# Patient Record
Sex: MALE | Race: WHITE | URBAN - METROPOLITAN AREA
[De-identification: names, ages, dates, MRNs, and addresses within clinical notes are randomized per-mention and may not be internally consistent; named-entity substitution may affect disease eponyms.]

---

## 2019-10-22 ENCOUNTER — TRANSFERRED RECORDS (OUTPATIENT)
Dept: HEALTH INFORMATION MANAGEMENT | Facility: CLINIC | Age: 24
End: 2019-10-22

## 2019-12-04 ENCOUNTER — TRANSFERRED RECORDS (OUTPATIENT)
Dept: HEALTH INFORMATION MANAGEMENT | Facility: CLINIC | Age: 24
End: 2019-12-04

## 2019-12-30 ENCOUNTER — TRANSFERRED RECORDS (OUTPATIENT)
Dept: HEALTH INFORMATION MANAGEMENT | Facility: CLINIC | Age: 24
End: 2019-12-30

## 2020-04-23 ENCOUNTER — TRANSFERRED RECORDS (OUTPATIENT)
Dept: HEALTH INFORMATION MANAGEMENT | Facility: CLINIC | Age: 25
End: 2020-04-23

## 2020-07-01 ENCOUNTER — TRANSFERRED RECORDS (OUTPATIENT)
Dept: HEALTH INFORMATION MANAGEMENT | Facility: CLINIC | Age: 25
End: 2020-07-01

## 2020-12-07 ENCOUNTER — TRANSFERRED RECORDS (OUTPATIENT)
Dept: HEALTH INFORMATION MANAGEMENT | Facility: CLINIC | Age: 25
End: 2020-12-07

## 2021-02-05 ENCOUNTER — TRANSFERRED RECORDS (OUTPATIENT)
Dept: HEALTH INFORMATION MANAGEMENT | Facility: CLINIC | Age: 26
End: 2021-02-05

## 2021-02-09 ENCOUNTER — TRANSCRIBE ORDERS (OUTPATIENT)
Dept: OTHER | Age: 26
End: 2021-02-09

## 2021-02-09 DIAGNOSIS — N35.919 URETHRAL STRICTURE: Primary | ICD-10-CM

## 2021-02-11 NOTE — TELEPHONE ENCOUNTER
MEDICAL RECORDS REQUEST   [facility]  Urology Clinic  [address] [place] [address]  PHONE: [phone]  Fax: [phone]        FUTURE VISIT INFORMATION                                                   EVELYN Del Toro: 1995 scheduled for future visit at [facility]    APPOINTMENT INFORMATION:    Date: 3/8/2021 3:30PM    Provider:  Jozef DAVIS    Reason for Visit/Diagnosis: Urethral stricture     REFERRAL INFORMATION:    Referring provider:  N/A    Specialty: N/A    Referring providers clinic:  [facility]    Clinic contact number:  N/A    RECORDS REQUESTED FOR VISIT                                                     NOTES  STATUS/DETAILS   OFFICE NOTE from referring provider  yes   OFFICE NOTE from other specialist  yes   DISCHARGE SUMMARY from hospital  no   DISCHARGE REPORT from the ER  no   OPERATIVE REPORT  no   MEDICATION LIST  yes   URETHRAL STRICTURE     RUG (IMAGES & REPORT)  in process   VCUG  (IMAGES & REPORT)  in process     PRE-VISIT CHECKLIST      Record collection complete YES- Urology recs scanned in epic  Images in  PACS  CT AP 20  ALMA 20, 10/22/19    If no, please explain: CSS faxed to [facility] to obtain recs    Appointment appropriately scheduled           (right time/right provider) Yes   MyChart activation If no, please explain: in process    Questionnaire complete If no, please explain: in process      Completed by: Yenni Alexis

## 2021-02-26 ENCOUNTER — PRE VISIT (OUTPATIENT)
Dept: UROLOGY | Facility: CLINIC | Age: 26
End: 2021-02-26

## 2021-02-26 DIAGNOSIS — N35.919 URETHRAL STRICTURE: Primary | ICD-10-CM

## 2021-02-26 NOTE — TELEPHONE ENCOUNTER
Reason for visit: urethral stricture consult     Relevant information: SP tube    Records/imaging/labs/orders: orders placed for imaging    Pt called: yes    At Rooming: standard

## 2021-03-01 ENCOUNTER — HOSPITAL ENCOUNTER (OUTPATIENT)
Dept: GENERAL RADIOLOGY | Facility: CLINIC | Age: 26
Discharge: HOME | End: 2021-03-01
Attending: UROLOGY | Admitting: UROLOGY
Payer: COMMERCIAL

## 2021-03-01 DIAGNOSIS — N35.919 URETHRAL STRICTURE: ICD-10-CM

## 2021-03-01 PROCEDURE — 255N000002 HC RX 255 OP 636: Performed by: RADIOLOGY

## 2021-03-01 PROCEDURE — 51610 INJECTION FOR BLADDER X-RAY: CPT | Mod: GC | Performed by: RADIOLOGY

## 2021-03-01 PROCEDURE — 74455 X-RAY URETHRA/BLADDER: CPT | Mod: 26 | Performed by: RADIOLOGY

## 2021-03-01 PROCEDURE — 74455 X-RAY URETHRA/BLADDER: CPT

## 2021-03-01 PROCEDURE — 250N000009 HC RX 250: Performed by: RADIOLOGY

## 2021-03-01 PROCEDURE — 51610 INJECTION FOR BLADDER X-RAY: CPT

## 2021-03-01 RX ORDER — IOPAMIDOL 612 MG/ML
50 INJECTION, SOLUTION INTRAVASCULAR ONCE
Status: COMPLETED | OUTPATIENT
Start: 2021-03-01 | End: 2021-03-01

## 2021-03-01 RX ORDER — LIDOCAINE HYDROCHLORIDE 20 MG/ML
1 JELLY TOPICAL ONCE
Status: COMPLETED | OUTPATIENT
Start: 2021-03-01 | End: 2021-03-01

## 2021-03-01 RX ORDER — IOPAMIDOL 510 MG/ML
150 INJECTION, SOLUTION INTRAVASCULAR ONCE
Status: COMPLETED | OUTPATIENT
Start: 2021-03-01 | End: 2021-03-01

## 2021-03-01 RX ADMIN — IOPAMIDOL 200 ML: 510 INJECTION, SOLUTION INTRAVASCULAR at 14:08

## 2021-03-01 RX ADMIN — LIDOCAINE HYDROCHLORIDE 1 TUBE: 20 JELLY TOPICAL at 14:08

## 2021-03-01 RX ADMIN — IOPAMIDOL 60 ML: 612 INJECTION, SOLUTION INTRAVENOUS at 14:06

## 2021-03-08 ENCOUNTER — OFFICE VISIT (OUTPATIENT)
Dept: UROLOGY | Facility: CLINIC | Age: 26
End: 2021-03-08
Payer: COMMERCIAL

## 2021-03-08 ENCOUNTER — PRE VISIT (OUTPATIENT)
Dept: UROLOGY | Facility: CLINIC | Age: 26
End: 2021-03-08

## 2021-03-08 VITALS — SYSTOLIC BLOOD PRESSURE: 143 MMHG | HEART RATE: 74 BPM | DIASTOLIC BLOOD PRESSURE: 80 MMHG

## 2021-03-08 DIAGNOSIS — N99.111 POSTPROCEDURAL BULBOUS URETHRAL STRICTURE: Primary | ICD-10-CM

## 2021-03-08 PROBLEM — I10 ESSENTIAL HYPERTENSION: Status: ACTIVE | Noted: 2020-03-11

## 2021-03-08 PROCEDURE — 99204 OFFICE O/P NEW MOD 45 MIN: CPT | Mod: GC | Performed by: UROLOGY

## 2021-03-08 RX ORDER — ROSUVASTATIN CALCIUM 20 MG/1
20 TABLET, COATED ORAL
COMMUNITY
Start: 2020-02-16

## 2021-03-08 RX ORDER — LISINOPRIL AND HYDROCHLOROTHIAZIDE 12.5; 2 MG/1; MG/1
1 TABLET ORAL
COMMUNITY
Start: 2020-02-12

## 2021-03-08 RX ORDER — POTASSIUM CITRATE 15 MEQ/1
3 TABLET, EXTENDED RELEASE ORAL
COMMUNITY
Start: 2020-01-23

## 2021-03-08 RX ORDER — OXYBUTYNIN CHLORIDE 10 MG/1
10 TABLET, EXTENDED RELEASE ORAL
COMMUNITY
Start: 2021-01-25 | End: 2021-03-26

## 2021-03-08 ASSESSMENT — PAIN SCALES - GENERAL: PAINLEVEL: NO PAIN (0)

## 2021-03-08 NOTE — PROGRESS NOTES
UROLOGY CONSULT HISTORY & PHYSICAL    Name: Shan Villalobos    MRN: [id number]   YOB: 1995           CHIEF COMPLAINT:  Urethral stricture  HISTORY OF PRESENT ILLNESS:  Mr. Villalobos is a 26 year old-year-old man seen in consultation from Dr. Monson of [facility] for urethral stricture.  He has a history of kidney stones requiring laser lithotripsy; patient reports that with of these surgeries Dr Monson reported that the urethra was tight but sufficient for cystoscopy. Most recently he had a bladder stone treated 1/25 in [place] at which time this stricture was found to be worse than before and the provider was unable to pass it with a cystoscope despite attempts at dilating. An SPT was placed at this time after an open cystolitholapaxy. He also has a hx of rUTIs at least once per month which have been sufficiently treated with outpatient abx.  He reports a slow stream and extreme urgency and frequency. No hesitency. He has had some urge incontinence. He does feel that he can empty his bladder all the way. He wakes up at least 2x per night to pee, more when he has an infection.   He does not have a history of self dilation. He currently does not perform self dilation. He does currently have an indwelling suprapubic catheter but he is able to void per urethra.  Etiology:  He denies a history of sexually transmitted diseases. He denies a history of straddle injury. He denies a history of pelvic fracture. He confirms a history of urethral catheterization or instrumentation.  REVIEW OF QUESTIONNAIRES:  Questionnaires reviewed. See flowsheet for details.    - AUASS    - MARTY    - Male Sexual Health    - Urethral stricture    PMH:  HTN, high cholesterol, nephrolithiasis (cysteine stones)    PSH:   URS HLL, orthopedic surgeries    No past medical history on file.  No past surgical history on file.  Current Outpatient Medications   Medication     lisinopril-hydrochlorothiazide (ZESTORETIC) 20-12.5 MG tablet      oxybutynin ER (DITROPAN-XL) 10 MG 24 hr tablet     potassium citrate 15 MEQ (1620 MG) TBCR     rosuvastatin (CRESTOR) 20 MG tablet     No current facility-administered medications for this visit.      Allergies as of 03/08/2021     (No Known Allergies)       No family history on file.  Social History     Socioeconomic History     Marital status:      Spouse name: Not on file     Number of children: Not on file     Years of education: Not on file     Highest education level: Not on file   Occupational History     Not on file   Social Needs     Financial resource strain: Not on file     Food insecurity     Worry: Not on file     Inability: Not on file     Transportation needs     Medical: Not on file     Non-medical: Not on file   Tobacco Use     Smoking status: Current Every Day Smoker     Smokeless tobacco: Never Used     Tobacco comment: Vape   Substance and Sexual Activity     Alcohol use: Not on file     Drug use: Not on file     Sexual activity: Not on file   Lifestyle     Physical activity     Days per week: Not on file     Minutes per session: Not on file     Stress: Not on file   Relationships     Social connections     Talks on phone: Not on file     Gets together: Not on file     Attends Oriental orthodox service: Not on file     Active member of club or organization: Not on file     Attends meetings of clubs or organizations: Not on file     Relationship status: Not on file     Intimate partner violence     Fear of current or ex partner: Not on file     Emotionally abused: Not on file     Physically abused: Not on file     Forced sexual activity: Not on file   Other Topics Concern     Not on file   Social History Narrative     Not on file     REVIEW OF SYSTEMS:   See HPI for pertinent details.  Remainder of 10-point ROS negative.  PHYSICAL EXAM:  General: Well-dressed, well-nourished man in no acute distress  Vitals: BP (!) 143/80   Pulse 74  There is no height or weight on file to calculate BMI.  Lungs:  No respiratory distress  : circumcised. Meatal stenosis is absent, penile plaques are absent. Skin lesions are absent.16Fr Bougie a boule inserted into urethra with resistance felt at penoscrotal junction.   Neurologic: Grossly nonfocal    REVIEW OF IMAGING STUDIES:  Retrograde urethrogram and voiding cystourethrogram were performed earlier and the images were independently interpreted by me and are reviewed with the patient.    Herd to tell on imaging if this is just a Penoscrotal junction stricture or if it extends into the penis but the Bougie a Boules demonstrated it was just Penoscrotal junction         LABS: Cr 0.77 on 1/26/21    REVIEW OF OUTSIDE RECORDS:  I reviewed outside records for 10 minutes.    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  He has been managed by prior urologist(s) and is referred to our center for advanced treatment options. Discussed with him that our recommendation for this would be to perform a buccal graft dorsal onlay urethroplasty. Will schedule for surgery. He will need 3d antibiotics before surgery due to SPT. He understands the risks to include but not be limited to bleeding, infection, penile or scrotal pain/numbness, change in erectile or ejaculatory function, need for additional procedures and recurrence of primary disease. He wishes to proceed.    Patient was seen and examined with Dr. Jozef Levine MD  Urology Resident      ==========================    Additional Billing and Coding Information:    Chronic illness (stricture) with exacerbation (now has SPT and recurrent UTI)    Review of external notes as documented above   Review of the result(s) of each unique test - urethrogram and Cr    Independent interpretation of a test performed by another physician/other qualified health care professional (not separately reported) - urethrogram     Elective surgery with  risk factors        ==========================      =======================================================  As the attending surgeon I, Erik Haskins, interviewed and examined the patient. The plan was developed between me and the patient. My findings and plan are as stated by the resident.    Erik Haskins MD  N4

## 2021-03-08 NOTE — NURSING NOTE
Chief Complaint   Patient presents with     Consult     Urethroplasty follow up       Blood pressure (!) 143/80, pulse 74. There is no height or weight on file to calculate BMI.    Patient Active Problem List   Diagnosis     Essential hypertension       No Known Allergies    Current Outpatient Medications   Medication Sig Dispense Refill     lisinopril-hydrochlorothiazide (ZESTORETIC) 20-12.5 MG tablet Take 1 tablet by mouth       oxybutynin ER (DITROPAN-XL) 10 MG 24 hr tablet Take 10 mg by mouth       potassium citrate 15 MEQ (1620 MG) TBCR Take 3 tablets by mouth       rosuvastatin (CRESTOR) 20 MG tablet Take 20 mg by mouth         Social History     Tobacco Use     Smoking status: Current Every Day Smoker     Smokeless tobacco: Never Used     Tobacco comment: Vape   Substance Use Topics     Alcohol use: None     Drug use: None       Jacqueline Ly CMA  3/8/2021  4:02 PM

## 2021-03-08 NOTE — LETTER
3/8/2021       RE: Shan Villalobos  [address] [facility] [address]     Dear Colleague,    Thank you for referring your patient, Shan Villalobos, to the [facility] [place] at [facility]. Please see a copy of my visit note below.    UROLOGY CONSULT HISTORY & PHYSICAL    Name: Shan Villalobos    MRN: [id number]   YOB: 1995           CHIEF COMPLAINT:  Urethral stricture  HISTORY OF PRESENT ILLNESS:  Mr. Villalobos is a 26 year old-year-old man seen in consultation from Dr. Monson of [facility] for urethral stricture.  He has a history of kidney stones requiring laser lithotripsy; patient reports that with of these surgeries Dr Monson reported that the urethra was tight but sufficient for cystoscopy. Most recently he had a bladder stone treated 1/25 in [place] at which time this stricture was found to be worse than before and the provider was unable to pass it with a cystoscope despite attempts at dilating. An SPT was placed at this time after an open cystolitholapaxy. He also has a hx of rUTIs at least once per month which have been sufficiently treated with outpatient abx.  He reports a slow stream and extreme urgency and frequency. No hesitency. He has had some urge incontinence. He does feel that he can empty his bladder all the way. He wakes up at least 2x per night to pee, more when he has an infection.   He does not have a history of self dilation. He currently does not perform self dilation. He does currently have an indwelling suprapubic catheter but he is able to void per urethra.  Etiology:  He denies a history of sexually transmitted diseases. He denies a history of straddle injury. He denies a history of pelvic fracture. He confirms a history of urethral catheterization or instrumentation.  REVIEW OF QUESTIONNAIRES:  Questionnaires reviewed. See flowsheet for details.    - AUASS    - MARTY    - Male Sexual Health    - Urethral  stricture    PMH:  HTN, high cholesterol, nephrolithiasis (cysteine stones)    PSH:   URS HLL, orthopedic surgeries    No past medical history on file.  No past surgical history on file.  Current Outpatient Medications   Medication     lisinopril-hydrochlorothiazide (ZESTORETIC) 20-12.5 MG tablet     oxybutynin ER (DITROPAN-XL) 10 MG 24 hr tablet     potassium citrate 15 MEQ (1620 MG) TBCR     rosuvastatin (CRESTOR) 20 MG tablet     No current facility-administered medications for this visit.      Allergies as of 03/08/2021     (No Known Allergies)       No family history on file.  Social History     Socioeconomic History     Marital status:      Spouse name: Not on file     Number of children: Not on file     Years of education: Not on file     Highest education level: Not on file   Occupational History     Not on file   Social Needs     Financial resource strain: Not on file     Food insecurity     Worry: Not on file     Inability: Not on file     Transportation needs     Medical: Not on file     Non-medical: Not on file   Tobacco Use     Smoking status: Current Every Day Smoker     Smokeless tobacco: Never Used     Tobacco comment: Vape   Substance and Sexual Activity     Alcohol use: Not on file     Drug use: Not on file     Sexual activity: Not on file   Lifestyle     Physical activity     Days per week: Not on file     Minutes per session: Not on file     Stress: Not on file   Relationships     Social connections     Talks on phone: Not on file     Gets together: Not on file     Attends Mandaeism service: Not on file     Active member of club or organization: Not on file     Attends meetings of clubs or organizations: Not on file     Relationship status: Not on file     Intimate partner violence     Fear of current or ex partner: Not on file     Emotionally abused: Not on file     Physically abused: Not on file     Forced sexual activity: Not on file   Other Topics Concern     Not on file   Social  History Narrative     Not on file     REVIEW OF SYSTEMS:   See HPI for pertinent details.  Remainder of 10-point ROS negative.  PHYSICAL EXAM:  General: Well-dressed, well-nourished man in no acute distress  Vitals: BP (!) 143/80   Pulse 74  There is no height or weight on file to calculate BMI.  Lungs: No respiratory distress  : circumcised. Meatal stenosis is absent, penile plaques are absent. Skin lesions are absent.16Fr Bougie a boule inserted into urethra with resistance felt at penoscrotal junction.   Neurologic: Grossly nonfocal    REVIEW OF IMAGING STUDIES:  Retrograde urethrogram and voiding cystourethrogram were performed earlier and the images were independently interpreted by me and are reviewed with the patient.    Herd to tell on imaging if this is just a Penoscrotal junction stricture or if it extends into the penis but the Bougie a Boules demonstrated it was just Penoscrotal junction         LABS: Cr 0.77 on 1/26/21    REVIEW OF OUTSIDE RECORDS:  I reviewed outside records for 10 minutes.    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  He has been managed by prior urologist(s) and is referred to our center for advanced treatment options. Discussed with him that our recommendation for this would be to perform a buccal graft dorsal onlay urethroplasty. Will schedule for surgery. He will need 3d antibiotics before surgery due to SPT. He understands the risks to include but not be limited to bleeding, infection, penile or scrotal pain/numbness, change in erectile or ejaculatory function, need for additional procedures and recurrence of primary disease. He wishes to proceed.    Patient was seen and examined with Dr. Jozef Levine MD  Urology Resident      ==========================    Additional Billing and Coding Information:    Chronic illness (stricture) with exacerbation (now has SPT and  recurrent UTI)    Review of external notes as documented above   Review of the result(s) of each unique test - urethrogram and Cr    Independent interpretation of a test performed by another physician/other qualified health care professional (not separately reported) - urethrogram     Elective surgery with risk factors        ==========================      =======================================================  As the attending surgeon I, Erik Haskins, interviewed and examined the patient. The plan was developed between me and the patient. My findings and plan are as stated by the resident.    Erik Haskins MD  N4

## 2021-03-09 ENCOUNTER — TELEPHONE (OUTPATIENT)
Dept: UROLOGY | Facility: CLINIC | Age: 26
End: 2021-03-09

## 2021-03-09 PROBLEM — N99.111 POSTPROCEDURAL BULBOUS URETHRAL STRICTURE: Status: ACTIVE | Noted: 2021-03-09

## 2021-03-09 NOTE — CONFIDENTIAL NOTE
Patient is scheduled for surgery with Dr. Jozef Hernandez with [facility]    Date of Surgery: 3/26/21    Location: [facility] OR    Informed patient they will need an adult  yes    H&P: Scheduled with PCP    Additional imaging/appointments: [facility] 4/12/21    Surgery packet: to be mailed by 3/12/21     Additional comments: n/a

## 2021-03-10 DIAGNOSIS — N35.919 URETHRAL STRICTURE: Primary | ICD-10-CM

## 2021-03-18 ENCOUNTER — PATIENT OUTREACH (OUTPATIENT)
Dept: UROLOGY | Facility: CLINIC | Age: 26
End: 2021-03-18

## 2021-03-18 ENCOUNTER — TRANSFERRED RECORDS (OUTPATIENT)
Dept: HEALTH INFORMATION MANAGEMENT | Facility: CLINIC | Age: 26
End: 2021-03-18

## 2021-03-18 NOTE — TELEPHONE ENCOUNTER
Patient contacted to assure he is prepared for surgery, Preop physical today at [facility] this included a urine culture.  They are to fax information ASAP today. Patient had Covid within the past 90 days and so is not eligible for covid testing at this time preoperatively.  Patient refreshed on other preop information denies questions at this time.  Direct phone number given to patient if concerns or question arise.  Verbal understanding given.  Jovanna Finch RN

## 2021-03-23 ENCOUNTER — PATIENT OUTREACH (OUTPATIENT)
Dept: UROLOGY | Facility: CLINIC | Age: 26
End: 2021-03-23

## 2021-03-23 DIAGNOSIS — Z01.812 PRE-PROCEDURE LAB EXAM: Primary | ICD-10-CM

## 2021-03-23 RX ORDER — DOXYCYCLINE HYCLATE 100 MG
100 TABLET ORAL 2 TIMES DAILY
Qty: 6 TABLET | Refills: 0 | Status: SHIPPED | OUTPATIENT
Start: 2021-03-23 | End: 2021-03-26

## 2021-03-25 ENCOUNTER — ANESTHESIA EVENT (OUTPATIENT)
Dept: SURGERY | Facility: AMBULATORY SURGERY CENTER | Age: 26
End: 2021-03-25

## 2021-03-26 ENCOUNTER — HOSPITAL ENCOUNTER (OUTPATIENT)
Facility: AMBULATORY SURGERY CENTER | Age: 26
Discharge: HOME | End: 2021-03-26
Attending: UROLOGY | Admitting: UROLOGY
Payer: COMMERCIAL

## 2021-03-26 ENCOUNTER — DOCUMENTATION ONLY (OUTPATIENT)
Dept: UROLOGY | Facility: CLINIC | Age: 26
End: 2021-03-26

## 2021-03-26 ENCOUNTER — ANESTHESIA (OUTPATIENT)
Dept: SURGERY | Facility: AMBULATORY SURGERY CENTER | Age: 26
End: 2021-03-26

## 2021-03-26 VITALS
DIASTOLIC BLOOD PRESSURE: 73 MMHG | HEART RATE: 79 BPM | BODY MASS INDEX: 30.8 KG/M2 | SYSTOLIC BLOOD PRESSURE: 130 MMHG | WEIGHT: 220 LBS | TEMPERATURE: 97.2 F | RESPIRATION RATE: 10 BRPM | HEIGHT: 71 IN | OXYGEN SATURATION: 95 %

## 2021-03-26 DIAGNOSIS — N99.111 POSTPROCEDURAL BULBOUS URETHRAL STRICTURE: ICD-10-CM

## 2021-03-26 PROCEDURE — 15240 FTH/GFT F/C/C/M/N/AX/G/H/F20: CPT

## 2021-03-26 PROCEDURE — 53410 RECONSTRUCTION OF URETHRA: CPT

## 2021-03-26 RX ORDER — CHLORHEXIDINE GLUCONATE ORAL RINSE 1.2 MG/ML
SOLUTION DENTAL PRN
Status: DISCONTINUED | OUTPATIENT
Start: 2021-03-26 | End: 2021-03-26 | Stop reason: HOSPADM

## 2021-03-26 RX ORDER — NALOXONE HYDROCHLORIDE 0.4 MG/ML
0.2 INJECTION, SOLUTION INTRAMUSCULAR; INTRAVENOUS; SUBCUTANEOUS
Status: DISCONTINUED | OUTPATIENT
Start: 2021-03-26 | End: 2021-03-27 | Stop reason: HOSPADM

## 2021-03-26 RX ORDER — NALOXONE HYDROCHLORIDE 0.4 MG/ML
0.4 INJECTION, SOLUTION INTRAMUSCULAR; INTRAVENOUS; SUBCUTANEOUS
Status: DISCONTINUED | OUTPATIENT
Start: 2021-03-26 | End: 2021-03-27 | Stop reason: HOSPADM

## 2021-03-26 RX ORDER — ONDANSETRON 4 MG/1
4 TABLET, ORALLY DISINTEGRATING ORAL EVERY 30 MIN PRN
Status: DISCONTINUED | OUTPATIENT
Start: 2021-03-26 | End: 2021-03-27 | Stop reason: HOSPADM

## 2021-03-26 RX ORDER — LIDOCAINE HYDROCHLORIDE 20 MG/ML
INJECTION, SOLUTION INFILTRATION; PERINEURAL PRN
Status: DISCONTINUED | OUTPATIENT
Start: 2021-03-26 | End: 2021-03-26

## 2021-03-26 RX ORDER — ONDANSETRON 2 MG/ML
4 INJECTION INTRAMUSCULAR; INTRAVENOUS EVERY 30 MIN PRN
Status: DISCONTINUED | OUTPATIENT
Start: 2021-03-26 | End: 2021-03-27 | Stop reason: HOSPADM

## 2021-03-26 RX ORDER — GABAPENTIN 300 MG/1
300 CAPSULE ORAL ONCE
Status: COMPLETED | OUTPATIENT
Start: 2021-03-26 | End: 2021-03-26

## 2021-03-26 RX ORDER — SENNA AND DOCUSATE SODIUM 50; 8.6 MG/1; MG/1
1 TABLET, FILM COATED ORAL 2 TIMES DAILY PRN
Qty: 30 TABLET | Refills: 0 | Status: SHIPPED | OUTPATIENT
Start: 2021-03-26

## 2021-03-26 RX ORDER — PROPOFOL 10 MG/ML
INJECTION, EMULSION INTRAVENOUS CONTINUOUS PRN
Status: DISCONTINUED | OUTPATIENT
Start: 2021-03-26 | End: 2021-03-26

## 2021-03-26 RX ORDER — MEPERIDINE HYDROCHLORIDE 25 MG/ML
12.5 INJECTION INTRAMUSCULAR; INTRAVENOUS; SUBCUTANEOUS
Status: DISCONTINUED | OUTPATIENT
Start: 2021-03-26 | End: 2021-03-27 | Stop reason: HOSPADM

## 2021-03-26 RX ORDER — SODIUM CHLORIDE, SODIUM LACTATE, POTASSIUM CHLORIDE, CALCIUM CHLORIDE 600; 310; 30; 20 MG/100ML; MG/100ML; MG/100ML; MG/100ML
INJECTION, SOLUTION INTRAVENOUS CONTINUOUS
Status: DISCONTINUED | OUTPATIENT
Start: 2021-03-26 | End: 2021-03-27 | Stop reason: HOSPADM

## 2021-03-26 RX ORDER — OXYCODONE HYDROCHLORIDE 5 MG/1
5 TABLET ORAL EVERY 4 HOURS PRN
Status: DISCONTINUED | OUTPATIENT
Start: 2021-03-26 | End: 2021-03-27 | Stop reason: HOSPADM

## 2021-03-26 RX ORDER — KETOROLAC TROMETHAMINE 30 MG/ML
INJECTION, SOLUTION INTRAMUSCULAR; INTRAVENOUS PRN
Status: DISCONTINUED | OUTPATIENT
Start: 2021-03-26 | End: 2021-03-26

## 2021-03-26 RX ORDER — BUPIVACAINE HYDROCHLORIDE AND EPINEPHRINE 5; 5 MG/ML; UG/ML
INJECTION, SOLUTION PERINEURAL PRN
Status: DISCONTINUED | OUTPATIENT
Start: 2021-03-26 | End: 2021-03-26 | Stop reason: HOSPADM

## 2021-03-26 RX ORDER — SODIUM CHLORIDE, SODIUM LACTATE, POTASSIUM CHLORIDE, CALCIUM CHLORIDE 600; 310; 30; 20 MG/100ML; MG/100ML; MG/100ML; MG/100ML
INJECTION, SOLUTION INTRAVENOUS CONTINUOUS
Status: DISCONTINUED | OUTPATIENT
Start: 2021-03-26 | End: 2021-03-26 | Stop reason: HOSPADM

## 2021-03-26 RX ORDER — ALBUTEROL SULFATE 0.83 MG/ML
2.5 SOLUTION RESPIRATORY (INHALATION) EVERY 4 HOURS PRN
Status: DISCONTINUED | OUTPATIENT
Start: 2021-03-26 | End: 2021-03-27 | Stop reason: HOSPADM

## 2021-03-26 RX ORDER — OXYCODONE HYDROCHLORIDE 5 MG/1
5 TABLET ORAL EVERY 6 HOURS PRN
Qty: 15 TABLET | Refills: 0 | Status: SHIPPED | OUTPATIENT
Start: 2021-03-26 | End: 2021-03-30

## 2021-03-26 RX ORDER — FENTANYL CITRATE 50 UG/ML
25-50 INJECTION, SOLUTION INTRAMUSCULAR; INTRAVENOUS
Status: DISCONTINUED | OUTPATIENT
Start: 2021-03-26 | End: 2021-03-27 | Stop reason: HOSPADM

## 2021-03-26 RX ORDER — DEXAMETHASONE SODIUM PHOSPHATE 4 MG/ML
INJECTION, SOLUTION INTRA-ARTICULAR; INTRALESIONAL; INTRAMUSCULAR; INTRAVENOUS; SOFT TISSUE PRN
Status: DISCONTINUED | OUTPATIENT
Start: 2021-03-26 | End: 2021-03-26

## 2021-03-26 RX ORDER — CIPROFLOXACIN 500 MG/1
500 TABLET, FILM COATED ORAL ONCE
Qty: 1 TABLET | Refills: 0 | Status: SHIPPED | OUTPATIENT
Start: 2021-04-12 | End: 2021-04-12

## 2021-03-26 RX ORDER — LIDOCAINE 40 MG/G
CREAM TOPICAL
Status: DISCONTINUED | OUTPATIENT
Start: 2021-03-26 | End: 2021-03-26 | Stop reason: HOSPADM

## 2021-03-26 RX ORDER — FENTANYL CITRATE 50 UG/ML
INJECTION, SOLUTION INTRAMUSCULAR; INTRAVENOUS PRN
Status: DISCONTINUED | OUTPATIENT
Start: 2021-03-26 | End: 2021-03-26

## 2021-03-26 RX ORDER — ACETAMINOPHEN 325 MG/1
975 TABLET ORAL ONCE
Status: COMPLETED | OUTPATIENT
Start: 2021-03-26 | End: 2021-03-26

## 2021-03-26 RX ORDER — TOLTERODINE TARTRATE 2 MG/1
2 TABLET, EXTENDED RELEASE ORAL 2 TIMES DAILY PRN
Qty: 30 TABLET | Refills: 0 | Status: SHIPPED | OUTPATIENT
Start: 2021-03-26

## 2021-03-26 RX ORDER — KETOROLAC TROMETHAMINE 30 MG/ML
30 INJECTION, SOLUTION INTRAMUSCULAR; INTRAVENOUS EVERY 6 HOURS PRN
Status: DISCONTINUED | OUTPATIENT
Start: 2021-03-26 | End: 2021-03-27 | Stop reason: HOSPADM

## 2021-03-26 RX ORDER — ONDANSETRON 2 MG/ML
INJECTION INTRAMUSCULAR; INTRAVENOUS PRN
Status: DISCONTINUED | OUTPATIENT
Start: 2021-03-26 | End: 2021-03-26

## 2021-03-26 RX ORDER — CHLORHEXIDINE GLUCONATE ORAL RINSE 1.2 MG/ML
15 SOLUTION DENTAL 4 TIMES DAILY
Qty: 473 ML | Refills: 0 | Status: SHIPPED | OUTPATIENT
Start: 2021-03-26 | End: 2021-05-03

## 2021-03-26 RX ORDER — PROPOFOL 10 MG/ML
INJECTION, EMULSION INTRAVENOUS PRN
Status: DISCONTINUED | OUTPATIENT
Start: 2021-03-26 | End: 2021-03-26

## 2021-03-26 RX ADMIN — PROPOFOL 200 MCG/KG/MIN: 10 INJECTION, EMULSION INTRAVENOUS at 07:24

## 2021-03-26 RX ADMIN — PROPOFOL 150 MG: 10 INJECTION, EMULSION INTRAVENOUS at 07:23

## 2021-03-26 RX ADMIN — Medication 50 MG: at 07:24

## 2021-03-26 RX ADMIN — FENTANYL CITRATE 50 MCG: 50 INJECTION, SOLUTION INTRAMUSCULAR; INTRAVENOUS at 07:29

## 2021-03-26 RX ADMIN — GABAPENTIN 300 MG: 300 CAPSULE ORAL at 06:44

## 2021-03-26 RX ADMIN — SODIUM CHLORIDE, SODIUM LACTATE, POTASSIUM CHLORIDE, CALCIUM CHLORIDE: 600; 310; 30; 20 INJECTION, SOLUTION INTRAVENOUS at 07:11

## 2021-03-26 RX ADMIN — Medication 0.5 MG: at 10:09

## 2021-03-26 RX ADMIN — ONDANSETRON 4 MG: 2 INJECTION INTRAMUSCULAR; INTRAVENOUS at 07:25

## 2021-03-26 RX ADMIN — FENTANYL CITRATE 100 MCG: 50 INJECTION, SOLUTION INTRAMUSCULAR; INTRAVENOUS at 08:12

## 2021-03-26 RX ADMIN — DEXAMETHASONE SODIUM PHOSPHATE 4 MG: 4 INJECTION, SOLUTION INTRA-ARTICULAR; INTRALESIONAL; INTRAMUSCULAR; INTRAVENOUS; SOFT TISSUE at 07:25

## 2021-03-26 RX ADMIN — PROPOFOL: 10 INJECTION, EMULSION INTRAVENOUS at 08:18

## 2021-03-26 RX ADMIN — ACETAMINOPHEN 975 MG: 325 TABLET ORAL at 06:44

## 2021-03-26 RX ADMIN — FENTANYL CITRATE 50 MCG: 50 INJECTION, SOLUTION INTRAMUSCULAR; INTRAVENOUS at 07:20

## 2021-03-26 RX ADMIN — LIDOCAINE HYDROCHLORIDE 80 MG: 20 INJECTION, SOLUTION INFILTRATION; PERINEURAL at 07:23

## 2021-03-26 RX ADMIN — KETOROLAC TROMETHAMINE 30 MG: 30 INJECTION, SOLUTION INTRAMUSCULAR; INTRAVENOUS at 09:50

## 2021-03-26 RX ADMIN — Medication 20 MG: at 08:35

## 2021-03-26 ASSESSMENT — MIFFLIN-ST. JEOR: SCORE: 1995.04

## 2021-03-26 NOTE — BRIEF OP NOTE
[facility]    Brief Operative Note    Pre-operative diagnosis: Postprocedural bulbous urethral stricture [N99.111]  Post-operative diagnosis Same as pre-operative diagnosis    Procedure: Procedure(s):  URETHROPLASTY, USING BUCCAL MUCOSA GRAFT, Removal of Suprapubic Catheter  Surgeon: Surgeon(s) and Role:     * Erik Haskins MD - Primary     * Mare Romero MD - Resident - Assisting     * Disha Jones MD - Fellow - Assisting  Anesthesia: General   Estimated blood loss: 20 mL  Drains:  16 Fr godfrey catheter  Specimens: * No specimens in log *  Findings:   6 cm penile urethral stricture, approximately 12 Fr. At the conclusion of the case were were able to pass a 24 Fr Bougie atraumatically. 6 cm x 2 cm buccal graft obtained from the left cheek. Cystoscopy demonstrated no residual stones in the bladder.   Complications: None.  Implants: * No implants in log *    Dispo: PACU then home. The patient will remove his dressing on POD#2 and return to clinic with a RUG/VCUG prior in 3 weeks.    Mare Romero MD  PGY-3 Urology  Pager [phone]

## 2021-03-26 NOTE — OP NOTE
PREOPERATIVE DIAGNOSIS: Urethral stricture of the anterior (penile) urethra.   POSTOPERATIVE DIAGNOSIS: Urethral stricture (6 cm) of the anterior (penile) urethra.   PROCEDURES:   1. Cystoscopy.   2. [place] of buccal mucosa graft from the bilateral oral cavity ( 2 cm wide x 6 cm long from left oral cavity,)  3. Preparation of wound bed for grafting   4. Complex single stage anterior urethral reconstruction of mid penile urethraurethral stricture utilizing a dorsal onlay of buccal mucosa graft ( 2 cm wide x 6 cm long)  SURGEON: Erik Haskins MD, MS  FELLOW: Disha Jones MD  RESIDENT: Mare Romero MD  SPECIMENS: None  SIGNIFICANT FINDINGS: 6 cm panurethral stricture of the mid penile urethra urethra. 24 Fr bougie able to pass atraumatically at the conclusion of the case.   DRAINS:  16 Fr godfrey catheter  INDICATIONS: Shan Villalobos is a 26 year old gentleman with a history of urolithiasis (cysteine stones) s/p multiple endoscopic procedures. He was recently discovered to have a dense penile urethral stricture at the time of planned cystolitholapaxy unable to be balloon dilated. He instead underwent open cystolithotomy with SPT placement. He now presents for definitive management of his stricture. The risks, benefits and alternatives of the multiple treatment options were described and the patient wished to proceed with urethroplasty. He understood the risks to include but not be limited to bleeding, infection, penile pain or numbness, scrotal pain or numbness, change in erectile or ejaculatory function, lower extremity neuropathy, deep venous thrombosis. He wished to proceed.   DESCRIPTION OF PROCEDURE:   After informed consent was obtained and preoperative antibiotics were given, the patient was taken to the operating room and placed supine on the operating table. General anesthesia was induced. He was intubated.    The patient was placed in the supine position. The genital area and perineum was shaved,  prepped and draped in the usual sterile fashion. The mouth was prepped and draped in the usual sterile fashion. A vertical midline incision was made in the ventral penile shaft over the urethral and carried down through the dartos. A 20-Romansh Bougie passed through the meatus and up to the point of obstruction in the mid penile urethra. The urethra was mobilized off corporal bodies only on the left side, from tip of the catheter to just beyond where we believed the proximal end of the stricture to be.  The urethral was rotated and a dorsal midline urethrotomy was made with a #15 blade scalpel over the tip of the Bougie. This was in the mid penile urethra. Holding stitches of 4-0 Vicryl were placed on the left side of the urethra. The urethrotomy was then continued proximally and distally for a total length of 6 cm until the urethra calibrated to 28F proximally and 28F distally. Upon visual inspection the urethral stricture appeared to be 6 cm long and the dorsal plate was as narrow as 1cm. The most proximal end of our urethrotomy was in the proximal penile urethra urethra.  A flexible cystoscopewas advanced through the proximal urethral opening. Additional urethral stricture was none. The voluntary sphincter was intact and was 5 cm proximal to our urethrotomy. The prostate was small . Bladder stones were absent. Trabeculations were absent. Tumors were absent. The scope was removed.   A 6 cm x 2 cm buccal graft was harvested from the left oral cavity in the standard fashion. Three holding sutures of 2-0 silk were placed in the vermillion border of the lip. The Steinhauser buccal mucosa retractor was put in place. The graft was marked out with calipers and hydrodissection was achieved with 0.5% Marcaine with 1:200,000 epinephrine. The graft was sharply harvested with a #15-blade scalpel taking care to avoid Pablito's duct and leave the buccinator muscle down with the patient. The graft was defatted and tapered on the  back table using a silicone block. The buccal mucosa was reapproximated in the mouth in a Z-plasty fashion using a running 4-0 chromic suture.  The corporal bodies (wound bed) were prepared for grafting by ensuring they were free of overlying tissue and controlling all bleeding points with bipolar cautery for 10 minutes.  We preplaced several sutures of 5-0 PDS in the proximal and distal apices of the urethra. The graft was not tapered and brought into the field. The graft was extensively quilted to the corporal bodies.  The anterior urethroplasty was then accomplished. This was started distally by sewing the distal apex of the urethrotomy to the distal apex of the graft and then running a 5-0 PDS on the inside (right side) of the urethra to the right side of the graft.   The anterior urethroplasty was then completed by closing the second (left ) side of the penile portion of the urethrotomy to the left side of the distal graft, working from distal to proximal.   This was all done over 16-Icelandic silicone catheter. The muscle was closed with interrupted 3-0 Vicryl suture. Colles fascia was closed with interrupted 3-0 Vicryl suture. Skin was closed with 4-0 Monocryl sutures in a running horizontal mattress fashion.   Bacitracin, telfa, and coban were used to wrap the penis. Fluffs and scrotal support were also placed. The patient was awakened from anesthesia, transferred to [facility] and then to the postanesthesia care unit in stable condition. There were no complications.     DISPO:   - PACU then home  - The patient will remove his penile dressing on POD#2  - Return to clinic in 3 weeks with RUG/VCUG prior    Mare Romero MD  PGY-3 Urology  Pager [phone]

## 2021-03-26 NOTE — DISCHARGE INSTRUCTIONS
[facility]  Home Care Following Anesthesia  For 24 hours after surgery:  1. Get plenty of rest.  A responsible adult must stay with you for at least 24 hours after you leave the surgery center.  2. Do not drive or use heavy equipment.  If you have weakness or tingling, don't drive or use heavy equipment until this feeling goes away.   3. Do not drink alcohol.   4. Avoid strenuous or risky activities.  Ask for help when climbing stairs.  5. You may feel lightheaded.  IF so, sit for a few minutes before standing.  Have someone help you get up.   6. If you have nausea (feel sick to your stomach): Drink only clear liquids such as apple juice, ginger ale, broth or 7-Up.  Rest may also help.  Be sure to drink enough fluids.  Move to a regular diet as you feel able.   7. You may have a slight fever.  Call the doctor if your fever is over 100 F (37.7 C) (taken under the tongue) or lasts longer than 24 hours.  8. You may have a dry mouth, a sore throat, muscle aches or trouble sleeping. These should go away after 24 hours.  9. Do not make important or legal decisions.               Tips for taking pain medications  To get the best pain relief possible, remember these points:    Take pain medications as directed, before pain becomes severe.    Pain medication can upset your stomach: taking it with food may help.    Constipation is a common side effect of pain medication. Drink plenty of  fluids.    Eat foods high in fiber. Take a stool softener if recommended by your doctor or pharmacist.    Do not drink alcohol, drive or operate machinery while taking pain medications.    Ask about other ways to control pain, such as with heat, ice or relaxation.    Tylenol/Acetaminophen Consumption  To help encourage the safe use of acetaminophen, the makers of TYLENOL  have lowered the maximum daily dose for single-ingredient Extra Strength TYLENOL  (acetaminophen) products sold in the [place] from 8  pills per day (4,000 mg) to 6 pills per day (3,000 mg). The dosing interval has also changed from 2 pills every 4-6 hours to 2 pills every 6 hours.    If you feel your pain relief is insufficient, you may take Tylenol/Acetaminophen in addition to your narcotic pain medication.     Be careful not to exceed 3,000 mg of Tylenol/Acetaminophen in a 24 hour period from all sources.    If you are taking extra strength Tylenol/acetaminophen (500 mg), the maximum dose is 6 tablets in 24 hours.    If you are taking regular strength acetaminophen (325 mg), the maximum dose is 9 tablets in 24 hours.    Call a doctor for any of the followin. Signs of infection (fever, growing tenderness at the surgery site, a large amount of drainage or bleeding, severe pain, foul-smelling drainage, redness, swelling).  2. It has been over 8 to 10 hours since surgery and you are still not able to urinate (pass water).  3. Headache for over 24 hours.  4. Numbness, tingling or weakness the day after surgery (if you had spinal anesthesia).  5. Signs of Covid-19 infection (temperature over 100 degrees, shortness of breath, cough, loss of taste/smell, generalized body aches, persistent headache, chills, sore throat, nausea/vomiting/diarrhea)  Your doctor is:  Dr. Erik Gonzalez, Prostate and Urology: [phone]                    Or dial [phone] and ask for the resident on call for:  Prostate Urology  For emergency care, call the:  [facility]:  [phone] (TTY for hearing impaired: [phone])

## 2021-03-26 NOTE — ANESTHESIA CARE TRANSFER NOTE
Patient: Shan Villalobos    Procedure(s):  URETHROPLASTY, USING BUCCAL MUCOSA GRAFT, Removal of Suprapubic Catheter    Diagnosis: Postprocedural bulbous urethral stricture [N99.111]  Diagnosis Additional Information: No value filed.    Anesthesia Type:   General     Note:    Oropharynx: oropharynx clear of all foreign objects  Level of Consciousness: drowsy  Oxygen Supplementation: face mask  Level of Supplemental Oxygen (L/min / FiO2): 6  Independent Airway: airway patency satisfactory and stable  Dentition: dentition unchanged  Vital Signs Stable: post-procedure vital signs reviewed and stable  Report to RN Given: handoff report given  Patient transferred to: PACU  Comments: VSS and WNL, comfortable, no PONV, report to Cee TIPTON  Handoff Report: Identifed the Patient, Identified the Reponsible Provider, Reviewed the pertinent medical history, Discussed the surgical course, Reviewed Intra-OP anesthesia mangement and issues during anesthesia, Set expectations for post-procedure period and Allowed opportunity for questions and acknowledgement of understanding      Vitals: (Last set prior to Anesthesia Care Transfer)  CRNA VITALS  3/26/2021 1006 - 3/26/2021 1040      3/26/2021             Resp Rate (observed):  (!) 1    Resp Rate (set):  10        Electronically Signed By: LAYLA Araiza CRNA  March 26, 2021  10:40 AM

## 2021-03-26 NOTE — ANESTHESIA POSTPROCEDURE EVALUATION
Patient: Shan Villalobos    Procedure(s):  URETHROPLASTY, USING BUCCAL MUCOSA GRAFT, Removal of Suprapubic Catheter    Diagnosis:Postprocedural bulbous urethral stricture [N99.111]  Diagnosis Additional Information: No value filed.    Anesthesia Type:  General    Note:  Disposition: Outpatient   Postop Pain Control: Uneventful            Sign Out: Well controlled pain   PONV: No   Neuro/Psych: Uneventful            Sign Out: Acceptable/Baseline neuro status   Airway/Respiratory: Uneventful            Sign Out: Acceptable/Baseline resp. status   CV/Hemodynamics: Uneventful            Sign Out: Acceptable CV status   Other NRE: NONE   DID A NON-ROUTINE EVENT OCCUR? No         Last vitals:  Vitals:    03/26/21 1100 03/26/21 1112 03/26/21 1133   BP: 124/83 136/75 130/73   Pulse: 75 79    Resp: 10 8 10   Temp:   36.2  C (97.2  F)   SpO2: 96% 96% 95%       Last vitals prior to Anesthesia Care Transfer:  CRNA VITALS  3/26/2021 1006 - 3/26/2021 1106      3/26/2021             Resp Rate (observed):  (!) 1    Resp Rate (set):  10          Electronically Signed By: Dwaine Coyle MD  March 26, 2021  1:34 PM

## 2021-03-26 NOTE — ANESTHESIA PREPROCEDURE EVALUATION
Anesthesia Pre-Procedure Evaluation    Patient: Shan Villalobos   MRN: [id number] : 1995        Preoperative Diagnosis: Postprocedural bulbous urethral stricture [N99.111]   Procedure : Procedure(s):  URETHROPLASTY, USING BUCCAL MUCOSA GRAFT     Past Medical History:   Diagnosis Date     Hypertension       Past Surgical History:   Procedure Laterality Date     GENITOURINARY SURGERY        No Known Allergies   Social History     Tobacco Use     Smoking status: Current Every Day Smoker     Smokeless tobacco: Never Used     Tobacco comment: Vape   Substance Use Topics     Alcohol use: Not on file      Wt Readings from Last 1 Encounters:   21 99.8 kg (220 lb)        Anesthesia Evaluation   Pt has had prior anesthetic. Type: General.        ROS/MED HX  ENT/Pulmonary:  - neg pulmonary ROS     Neurologic:  - neg neurologic ROS     Cardiovascular:     (+) hypertension-----    METS/Exercise Tolerance:     Hematologic:  - neg hematologic  ROS     Musculoskeletal:  - neg musculoskeletal ROS     GI/Hepatic:  - neg GI/hepatic ROS     Renal/Genitourinary:  - neg Renal ROS     Endo:  - neg endo ROS     Psychiatric/Substance Use:  - neg psychiatric ROS     Infectious Disease:  - neg infectious disease ROS     Malignancy:  - neg malignancy ROS     Other:            Physical Exam    Airway  airway exam normal           Respiratory Devices and Support         Dental  no notable dental history         Cardiovascular   cardiovascular exam normal          Pulmonary   pulmonary exam normal                OUTSIDE LABS:  CBC: No results found for: WBC, HGB, HCT, PLT  BMP: No results found for: NA, POTASSIUM, CHLORIDE, CO2, BUN, CR, GLC  COAGS: No results found for: PTT, INR, FIBR  POC: No results found for: BGM, HCG, HCGS  HEPATIC: No results found for: ALBUMIN, PROTTOTAL, ALT, AST, GGT, ALKPHOS, BILITOTAL, BILIDIRECT, DAMI  OTHER: No results found for: PH, LACT, A1C, ROBERTO, PHOS, MAG, LIPASE, AMYLASE, TSH, T4, T3, CRP,  SED    Anesthesia Plan    ASA Status:  2   NPO Status:  NPO Appropriate    Anesthesia Type: General.     - Airway: ETT   Induction: Intravenous.   Maintenance: TIVA.        Consents    Anesthesia Plan(s) and associated risks, benefits, and realistic alternatives discussed. Questions answered and patient/representative(s) expressed understanding.     - Discussed with:  Patient      - Extended Intubation/Ventilatory Support Discussed: No.         Postoperative Care    Pain management: Oral pain medications.   PONV prophylaxis: Ondansetron (or other 5HT-3), Dexamethasone or Solumedrol     Comments:                Ryan Carlson MD, MD

## 2021-04-09 ENCOUNTER — PRE VISIT (OUTPATIENT)
Dept: UROLOGY | Facility: CLINIC | Age: 26
End: 2021-04-09

## 2021-04-09 NOTE — TELEPHONE ENCOUNTER
Reason for visit: post op chek     Dx/Hx/Sx: s/p urethroplasty 3/26    Records/imaging/labs/orders: avail    Pt called: NA    At Rooming: undress waist down

## 2021-04-12 ENCOUNTER — OFFICE VISIT (OUTPATIENT)
Dept: UROLOGY | Facility: CLINIC | Age: 26
End: 2021-04-12
Payer: COMMERCIAL

## 2021-04-12 ENCOUNTER — ANCILLARY PROCEDURE (OUTPATIENT)
Dept: GENERAL RADIOLOGY | Facility: CLINIC | Age: 26
End: 2021-04-12
Attending: UROLOGY
Payer: COMMERCIAL

## 2021-04-12 VITALS
SYSTOLIC BLOOD PRESSURE: 144 MMHG | WEIGHT: 245 LBS | BODY MASS INDEX: 35.07 KG/M2 | DIASTOLIC BLOOD PRESSURE: 79 MMHG | HEIGHT: 70 IN | HEART RATE: 92 BPM

## 2021-04-12 DIAGNOSIS — E66.01 MORBID OBESITY (H): ICD-10-CM

## 2021-04-12 DIAGNOSIS — N99.114 POSTPROCEDURAL STRICTURE OF ANTERIOR URETHRA: Primary | ICD-10-CM

## 2021-04-12 DIAGNOSIS — N35.919 URETHRAL STRICTURE: ICD-10-CM

## 2021-04-12 PROCEDURE — 99024 POSTOP FOLLOW-UP VISIT: CPT | Performed by: STUDENT IN AN ORGANIZED HEALTH CARE EDUCATION/TRAINING PROGRAM

## 2021-04-12 PROCEDURE — 74455 X-RAY URETHRA/BLADDER: CPT | Mod: GC | Performed by: RADIOLOGY

## 2021-04-12 PROCEDURE — 51600 INJECTION FOR BLADDER X-RAY: CPT | Mod: GC | Performed by: RADIOLOGY

## 2021-04-12 ASSESSMENT — MIFFLIN-ST. JEOR: SCORE: 2092.56

## 2021-04-12 ASSESSMENT — PAIN SCALES - GENERAL: PAINLEVEL: NO PAIN (0)

## 2021-04-12 NOTE — PROGRESS NOTES
Follow-up after Urethroplasty    Shan Villalobos is a 26 year old gentleman with a history of urolithiasis (cystine stones) s/p multiple endoscopic procedures. He was recently discovered to have a dense penile urethral stricture at the time of planned cystolitholapaxy unable to be balloon dilated. He instead underwent open cystolithotomy with SPT placement.    On 3/26, he underwent Complex single stage anterior urethral reconstruction of mid penile stricture utilizing a dorsal onlay of buccal mucosa graft (2 cm wide x 6 cm long).     Post-op VCUG today showed: mod sized leak at mid urethra.     PROCEDURE NOTE: Gould replacement.   The penis was prepped and draped in a sterile fashion.  A 14 Fr coude catheter placed with ease in a sterile fashion.   10cc was placed in the balloon. Efflux of urine was noted.     Exam:  B/P: 144/79, T: Data Unavailable, P: 92, R: Data Unavailable  Ventral midline penile incision with 1cm wound dehiscence at mid urethra.   No tenderness or evidence of cellulitis  No hematoma    Assessment and Plan:  Shan Villalobos is a 26 year old gentleman with a history of urolithiasis (cystine stones) s/p multiple endoscopic procedures with a  Recurrent penile urethral stricture s/p dorsal buccal graft on 3/26 with moderate sized leak. Gould replaced.     F/U:   - F/U in 3 weeks for VCUG  - Wound check at that time  - Keep area clean and dry

## 2021-04-12 NOTE — LETTER
4/12/2021       RE: [facility]  [address] [facility] [place] [address]     Dear Colleague,    Thank you for referring your patient, Shan Villalobos, to the [facility] [place] [facility]. Please see a copy of my visit note below.    Follow-up after Urethroplasty    Shan Villalobos is a 26 year old gentleman with a history of urolithiasis (cystine stones) s/p multiple endoscopic procedures. He was recently discovered to have a dense penile urethral stricture at the time of planned cystolitholapaxy unable to be balloon dilated. He instead underwent open cystolithotomy with SPT placement.    On 3/26, he underwent Complex single stage anterior urethral reconstruction of mid penile stricture utilizing a dorsal onlay of buccal mucosa graft (2 cm wide x 6 cm long).     Post-op VCUG today showed: mod sized leak at mid urethra.     PROCEDURE NOTE: Gould replacement.   The penis was prepped and draped in a sterile fashion.  A 14 Fr coude catheter placed with ease in a sterile fashion.   10cc was placed in the balloon. Efflux of urine was noted.     Exam:  B/P: 144/79, T: Data Unavailable, P: 92, R: Data Unavailable  Ventral midline penile incision with 1cm wound dehiscence at mid urethra.   No tenderness or evidence of cellulitis  No hematoma    Assessment and Plan:  Shan Villalobos is a 26 year old gentleman with a history of urolithiasis (cystine stones) s/p multiple endoscopic procedures with a  Recurrent penile urethral stricture s/p dorsal buccal graft on 3/26 with moderate sized leak. Gould replaced.     F/U:   - F/U in 3 weeks for VCUG  - Wound check at that time  - Keep area clean and dry      Again, thank you for allowing me to participate in the care of your patient.      Sincerely,    Disha Jones MD

## 2021-04-12 NOTE — PATIENT INSTRUCTIONS
Please return to the clinic in 3 weeks for a repeat voiding cystogram, and follow-up with Dr. Jones.    It was a pleasure meeting with you today.  Thank you for allowing me and my team the privilege of caring for you today.  YOU are the reason we are here, and I truly hope we provided you with the excellent service you deserve.  Please let us know if there is anything else we can do for you so that we can be sure you are leaving completely satisfied with your care experience.

## 2021-04-22 ENCOUNTER — PRE VISIT (OUTPATIENT)
Dept: UROLOGY | Facility: CLINIC | Age: 26
End: 2021-04-22

## 2021-04-22 NOTE — TELEPHONE ENCOUNTER
Reason for visit: Post op - review imaging     Relevant information: VCUG - post urethroplasty    Problem list   Patient Active Problem List   Diagnosis     Essential hypertension     Postprocedural bulbous urethral stricture     Morbid obesity (H)       Records/imaging/labs/orders: all records available    Pt called: no need for a call     At Rooming: standard

## 2021-05-03 ENCOUNTER — OFFICE VISIT (OUTPATIENT)
Dept: UROLOGY | Facility: CLINIC | Age: 26
End: 2021-05-03
Payer: COMMERCIAL

## 2021-05-03 ENCOUNTER — ANCILLARY PROCEDURE (OUTPATIENT)
Dept: GENERAL RADIOLOGY | Facility: CLINIC | Age: 26
End: 2021-05-03
Attending: STUDENT IN AN ORGANIZED HEALTH CARE EDUCATION/TRAINING PROGRAM
Payer: COMMERCIAL

## 2021-05-03 VITALS — HEART RATE: 103 BPM | DIASTOLIC BLOOD PRESSURE: 95 MMHG | SYSTOLIC BLOOD PRESSURE: 150 MMHG

## 2021-05-03 DIAGNOSIS — Z87.448 HISTORY OF URETHRAL STRICTURE: Primary | ICD-10-CM

## 2021-05-03 PROCEDURE — 51600 INJECTION FOR BLADDER X-RAY: CPT | Mod: GC | Performed by: RADIOLOGY

## 2021-05-03 PROCEDURE — 99024 POSTOP FOLLOW-UP VISIT: CPT | Mod: GC | Performed by: UROLOGY

## 2021-05-03 PROCEDURE — 74455 X-RAY URETHRA/BLADDER: CPT | Mod: GC | Performed by: RADIOLOGY

## 2021-05-03 RX ORDER — LIDOCAINE HYDROCHLORIDE 20 MG/ML
JELLY TOPICAL ONCE
Status: COMPLETED | OUTPATIENT
Start: 2021-05-03 | End: 2021-05-03

## 2021-05-03 RX ADMIN — LIDOCAINE HYDROCHLORIDE 10 ML: 20 JELLY TOPICAL at 12:13

## 2021-05-03 ASSESSMENT — PAIN SCALES - GENERAL: PAINLEVEL: NO PAIN (0)

## 2021-05-03 NOTE — LETTER
5/3/2021       RE: Shan Villalobos  [address] [place] [address]     Dear Colleague,    Thank you for referring your patient, Shan Villalobos, to the [facility] [place] [facility]. Please see a copy of my visit note below.    Follow-up after Urethroplasty     Shan Villalobos is a 26 year old gentleman with a history of urolithiasis (cystine stones) s/p multiple endoscopic procedures. He was recently discovered to have a dense penile urethral stricture at the time of planned cystolitholapaxy unable to be balloon dilated. He instead underwent open cystolithotomy with SPT placement.     On 3/26, he underwent Complex single stage anterior urethral reconstruction of mid penile stricture utilizing a dorsal onlay of buccal mucosa graft (2 cm wide x 6 cm long).      Post-op VCUG at that time showed: mod sized leak at mid urethra.      He underwent RUG today showing: decreased size in leak at mid-urethra. Minimal (though still present) post-procedure residual urine in the area.  He is overall very happy, denies any pain at rest or while urinating. No signs or symptoms of UTI. Reports good urinary function.     Exam:  PHYSICAL EXAM:  General: Well-dressed, well-nourished man in no acute distress  Vitals: BP (!) 150/95   Pulse 103     Circumcised. Well-healed surgical scars. B/l descended testes of normal consistency and firmness. Pea-sized hardness at penoscrotal junction.        Assessment and Plan:  Shan Villalobos is a 26 year old gentleman with a history of urolithiasis (cystine stones) s/p multiple endoscopic procedures with a  Recurrent penile urethral stricture s/p dorsal buccal graft on 3/26 with moderate sized leak on VCUG. Returns today with decrease in size of leak, overall very happy with results. We did discuss his options including leaving a catheter in place for several more weeks, or to attempt to go without cathter starting today,  though there potentially may be a higher risk of infection. He would like to pursue catheter removal and surveillance.                        F/U:   - Plan to go without catheter from here on out   -Did recommend he squeeze the area of hardness at the penoscrotal junction that likely corresponds to the collection of urine seen on RUG.  -Cleared for sexual intercourse starting today  -RTC in 3 months    Patient was seen and examined with Dr. Jozef Rich MD  Urology Resident      =======================================================  As the attending surgeon I, Erik Haskins, interviewed and examined the patient. The plan was developed between me and the patient. My findings and plan are as stated by the resident.    Erik Haskins MD

## 2021-05-03 NOTE — PATIENT INSTRUCTIONS
Schedule a cystoscopy in three months with Dr. Haskins. Please come with a full bladder for a urine flow test.    It was a pleasure meeting with you today.  Thank you for allowing me and my team the privilege of caring for you today.  YOU are the reason we are here, and I truly hope we provided you with the excellent service you deserve.  Please let us know if there is anything else we can do for you so that we can be sure you are leaving completely satisfied with your care experience.

## 2021-05-03 NOTE — NURSING NOTE
Chief Complaint   Patient presents with     RECHECK     Post op 3 week        Blood pressure (!) 150/95, pulse 103. There is no height or weight on file to calculate BMI.    Patient Active Problem List   Diagnosis     Essential hypertension     Postprocedural bulbous urethral stricture     Morbid obesity (H)       No Known Allergies    Current Outpatient Medications   Medication Sig Dispense Refill     lisinopril-hydrochlorothiazide (ZESTORETIC) 20-12.5 MG tablet Take 1 tablet by mouth       potassium citrate 15 MEQ (1620 MG) TBCR Take 3 tablets by mouth       rosuvastatin (CRESTOR) 20 MG tablet Take 20 mg by mouth       SENNA-docusate sodium (SENNA S) 8.6-50 MG tablet Take 1 tablet by mouth 2 times daily as needed (Contripation) 30 tablet 0     tolterodine (DETROL) 2 MG tablet Take 1 tablet (2 mg) by mouth 2 times daily as needed for other (for bladder spasms) 30 tablet 0       Social History     Tobacco Use     Smoking status: Current Every Day Smoker     Smokeless tobacco: Never Used     Tobacco comment: Vape   Substance Use Topics     Alcohol use: None     Drug use: None       Jacqueline Ly CMA  5/3/2021  12:21 PM

## 2021-05-03 NOTE — PROGRESS NOTES
Follow-up after Urethroplasty     Shan Villalobos is a 26 year old gentleman with a history of urolithiasis (cystine stones) s/p multiple endoscopic procedures. He was recently discovered to have a dense penile urethral stricture at the time of planned cystolitholapaxy unable to be balloon dilated. He instead underwent open cystolithotomy with SPT placement.     On 3/26, he underwent Complex single stage anterior urethral reconstruction of mid penile stricture utilizing a dorsal onlay of buccal mucosa graft (2 cm wide x 6 cm long).      Post-op VCUG at that time showed: mod sized leak at mid urethra.      He underwent RUG today showing: decreased size in leak at mid-urethra. Minimal (though still present) post-procedure residual urine in the area.  He is overall very happy, denies any pain at rest or while urinating. No signs or symptoms of UTI. Reports good urinary function.     Exam:  PHYSICAL EXAM:  General: Well-dressed, well-nourished man in no acute distress  Vitals: BP (!) 150/95   Pulse 103     Circumcised. Well-healed surgical scars. B/l descended testes of normal consistency and firmness. Pea-sized hardness at penoscrotal junction.        Assessment and Plan:  Shan Villalobos is a 26 year old gentleman with a history of urolithiasis (cystine stones) s/p multiple endoscopic procedures with a  Recurrent penile urethral stricture s/p dorsal buccal graft on 3/26 with moderate sized leak on VCUG. Returns today with decrease in size of leak, overall very happy with results. We did discuss his options including leaving a catheter in place for several more weeks, or to attempt to go without cathter starting today, though there potentially may be a higher risk of infection. He would like to pursue catheter removal and surveillance.                        F/U:   - Plan to go without catheter from here on out   -Did recommend he squeeze the area of hardness at the penoscrotal junction that likely corresponds to the  collection of urine seen on RUG.  -Cleared for sexual intercourse starting today  -RTC in 3 months    Patient was seen and examined with Dr. Jozef Rich MD  Urology Resident      =======================================================  As the attending surgeon I, Erik Haskins, interviewed and examined the patient. The plan was developed between me and the patient. My findings and plan are as stated by the resident.    Erik Haskins MD

## 2021-08-18 ENCOUNTER — PRE VISIT (OUTPATIENT)
Dept: UROLOGY | Facility: CLINIC | Age: 26
End: 2021-08-18

## 2021-08-18 NOTE — TELEPHONE ENCOUNTER
Reason for visit: Cystoscopy     Relevant information: history of urethroplasty    Problem list   Patient Active Problem List   Diagnosis     Essential hypertension     Postprocedural bulbous urethral stricture     Morbid obesity (H)       Records/imaging/labs/orders: all records available    Pt called: yes    At Rooming: flow/pvr, questionnaires

## 2021-08-23 ENCOUNTER — OFFICE VISIT (OUTPATIENT)
Dept: UROLOGY | Facility: CLINIC | Age: 26
End: 2021-08-23
Payer: COMMERCIAL

## 2021-08-23 VITALS
HEART RATE: 70 BPM | HEIGHT: 70 IN | SYSTOLIC BLOOD PRESSURE: 132 MMHG | DIASTOLIC BLOOD PRESSURE: 80 MMHG | WEIGHT: 244.6 LBS | BODY MASS INDEX: 35.02 KG/M2

## 2021-08-23 DIAGNOSIS — Z87.448 HISTORY OF URETHRAL STRICTURE: Primary | ICD-10-CM

## 2021-08-23 PROCEDURE — 51741 ELECTRO-UROFLOWMETRY FIRST: CPT | Performed by: UROLOGY

## 2021-08-23 PROCEDURE — 52000 CYSTOURETHROSCOPY: CPT | Performed by: UROLOGY

## 2021-08-23 PROCEDURE — 51798 US URINE CAPACITY MEASURE: CPT | Performed by: UROLOGY

## 2021-08-23 ASSESSMENT — MIFFLIN-ST. JEOR: SCORE: 2090.75

## 2021-08-23 ASSESSMENT — PAIN SCALES - GENERAL: PAINLEVEL: NO PAIN (0)

## 2021-08-23 NOTE — LETTER
8/23/2021       RE: Shan Villalobos  [address] [place] [address]     Dear Colleague,    Thank you for referring your patient, Shan Villalobos, to the [facility] [place] at [facility]. Please see a copy of my visit note below.    Urethroplasty Follow-up Visit with Surveillance Urethroscopy    PRE-PROCEDURE DIAGNOSIS: History of urethral stricture  POST-PROCEDURE DIAGNOSIS: No evidence of urethral stricture   PROCEDURE: Urethroscopy    HISTORY: Shan Villalobos is a 26 year old man 5 months status-post buccal graft dorsal onlay urethroplasty for penile urethral stricture.     BMG complaints: No  Positioning complaints: No  Perineal / Genital complaints: No  Urinary incontinence: No     He used to have a firm area on the penis but that is all better. He used to have some post-op post-void dribbling but that resolved.       Questionnaires reviewed. See flowsheet for details.    REVIEW OF OFFICE STUDIES:  Urinary Flow Rate  Peak Flow: 30.6 mL/s  Average Flow: 16.7 mL/s  Voided (mL): 313 mL  Residual Volume by Ultrasound: 256 mL  Character of Curve: Bell Shape     DESCRIPTION OF PROCEDURE:  After informed consent was obtained, the patient was brought to the procedure room where he was placed in the supine position with all pressure points well padded.  He was prepped and draped in a sterile fashion. A flexible cystoscope was introduced through a well-lubricated urethra.  The anterior urethra up to the point of reconstruction was normal in appearance. At the site of reconstruction there was evidence of stricture recurrence. The narrowest caliber of the urethra at the reconstruction was estimated to be 16F and this was located in the proximal anastomosis and 18F in the distal anastomosis. The flexible cystoscope did not pass the prox one.     ASSESSMENT AND PLAN:  Excellent outcome after urethroplasty. The patient will follow-up in 9 months with with  uroflowmetry, post-void residual urine volume measurement, Urethroplasty PROM, MARTY, MSHQ, and CLSS and post-op questionnaires. Cystoscopy will be needed. If symptoms recur cystoscopy will be done sooner.    Again, thank you for allowing me to participate in the care of your patient.      Sincerely,    Erik Haskins MD

## 2021-08-23 NOTE — NURSING NOTE
"Chief Complaint   Patient presents with     Cystoscopy     3 month post urethroplasty        Blood pressure 132/80, pulse 70, height 1.778 m (5' 10\"), weight 110.9 kg (244 lb 9.6 oz). Body mass index is 35.1 kg/m .    Patient Active Problem List   Diagnosis     Essential hypertension     Postprocedural bulbous urethral stricture     Morbid obesity (H)       No Known Allergies    Current Outpatient Medications   Medication Sig Dispense Refill     lisinopril-hydrochlorothiazide (ZESTORETIC) 20-12.5 MG tablet Take 1 tablet by mouth       potassium citrate 15 MEQ (1620 MG) TBCR Take 3 tablets by mouth       rosuvastatin (CRESTOR) 20 MG tablet Take 20 mg by mouth       SENNA-docusate sodium (SENNA S) 8.6-50 MG tablet Take 1 tablet by mouth 2 times daily as needed (Contripation) 30 tablet 0     tolterodine (DETROL) 2 MG tablet Take 1 tablet (2 mg) by mouth 2 times daily as needed for other (for bladder spasms) 30 tablet 0       Social History     Tobacco Use     Smoking status: Current Every Day Smoker     Smokeless tobacco: Never Used     Tobacco comment: Vape   Substance Use Topics     Alcohol use: None     Drug use: None       Jacqueline Ly CMA  2021  2:57 PM     Invasive Procedure Safety Checklist:    Procedure: Cystoscopy     Action: Complete sections and checkboxes as appropriate.    Pre-procedure:  1. Patient ID Verified with 2 identifiers (Sonia and  or MRN) : YES    2. Procedure and site verified with patient/designee (when able) : YES    3. Accurate consent documentation in medical record : YES    4. H&P (or appropriate assessment) documented in medical record : YES  H&P must be up to 30 days prior to procedure an updated within 24 hours of                 Procedure as applicable.     5. Relevant diagnostic and radiology test results appropriately labeled and displayed as applicable : YES    6. Blood products, implants, devices, and/or special equipment available for the procedure as applicable : " YES    7. Procedure site(s) marked with provider initials [Exclusions: None] : NO    8. Marking not required. Reason : Yes  Procedure does not require site marking    Time Out:     Time-Out performed immediately prior to starting procedure, including verbal and active participation of all team members addressing: YES    1. Correct patient identity.  2. Confirmed that the correct side and site are marked.  3. An accurate procedure to be done.  4. Agreement on the procedure to be done.  5. Correct patient position.  6. Relevant images and results are properly labeled and appropriately displayed.  7. The need to administer antibiotics or fluids for irrigation purposes during the procedure as applicable.  8. Safety precautions based on patient history or medication use.    During Procedure: Verification of correct person, site, and procedure occurs any time the responsibility for care of the patient is transferred to another member of the care team.    No medications administered during this procedure.    Jacqueline Ly CMA  August 23, 2021

## 2021-08-23 NOTE — PROGRESS NOTES
Urethroplasty Follow-up Visit with Surveillance Urethroscopy    PRE-PROCEDURE DIAGNOSIS: History of urethral stricture  POST-PROCEDURE DIAGNOSIS: No evidence of urethral stricture   PROCEDURE: Urethroscopy    HISTORY: Shan Villalobos is a 26 year old man 5 months status-post buccal graft dorsal onlay urethroplasty for penile urethral stricture.     BMG complaints: No  Positioning complaints: No  Perineal / Genital complaints: No  Urinary incontinence: No     He used to have a firm area on the penis but that is all better. He used to have some post-op post-void dribbling but that resolved.       Questionnaires reviewed. See flowsheet for details.    REVIEW OF OFFICE STUDIES:  Urinary Flow Rate  Peak Flow: 30.6 mL/s  Average Flow: 16.7 mL/s  Voided (mL): 313 mL  Residual Volume by Ultrasound: 256 mL  Character of Curve: Bell Shape     DESCRIPTION OF PROCEDURE:  After informed consent was obtained, the patient was brought to the procedure room where he was placed in the supine position with all pressure points well padded.  He was prepped and draped in a sterile fashion. A flexible cystoscope was introduced through a well-lubricated urethra.  The anterior urethra up to the point of reconstruction was normal in appearance. At the site of reconstruction there was evidence of stricture recurrence. The narrowest caliber of the urethra at the reconstruction was estimated to be 16F and this was located in the proximal anastomosis and 18F in the distal anastomosis. The flexible cystoscope did not pass the prox one.     ASSESSMENT AND PLAN:  Excellent outcome after urethroplasty. The patient will follow-up in 9 months with with uroflowmetry, post-void residual urine volume measurement, Urethroplasty PROM, MARTY, MSHQ, and CLSS and post-op questionnaires. Cystoscopy will be needed. If symptoms recur cystoscopy will be done sooner.

## 2021-08-23 NOTE — PATIENT INSTRUCTIONS
"Please schedule a cystoscopy in nine months. Please come with a full bladder.      AFTER YOUR CYSTOSCOPY        You have just completed a cystoscopy, or \"cysto\", which allowed your physician to learn more about your bladder (or to remove a stent placed after surgery). We suggest that you continue to avoid caffeine, fruit juice, and alcohol for the next 24 hours, however, you are encouraged to return to your normal activities.         A few things that are considered normal after your cystoscopy:     * Small amount of bleeding (or spotting) that clears within the next 24 hours     * Slight burning sensation with urination     * Sensation to of needing to avoid more frequently     * The feeling of \"air\" in your urine     * Mild discomfort that is relieved with Tylenol        Please contact our office promptly if you:     * Develop a fever above 101 degrees     * Are unable to urinate     * Develop bright red blood that does not stop     * Severe pain or swelling         Please contact our office with any concerns or questions @[email or web address].  "

## 2022-05-09 ENCOUNTER — PRE VISIT (OUTPATIENT)
Dept: UROLOGY | Facility: CLINIC | Age: 27
End: 2022-05-09
Payer: COMMERCIAL

## 2022-05-09 NOTE — TELEPHONE ENCOUNTER
Reason for visit: Cystoscopy      Relevant information: urethroplasty    Records/imaging/labs/orders: all records available    Pt called: yes, voicemail full    At Rooming: flow/pvr, questionnaires

## 2022-05-22 ENCOUNTER — HEALTH MAINTENANCE LETTER (OUTPATIENT)
Age: 27
End: 2022-05-22

## 2022-09-01 ENCOUNTER — PRE VISIT (OUTPATIENT)
Dept: UROLOGY | Facility: CLINIC | Age: 27
End: 2022-09-01

## 2022-09-01 NOTE — TELEPHONE ENCOUNTER
Reason for visit: Cystoscopy      Relevant information: urethroplasty    Records/imaging/labs/orders: all records available    Pt called: BALALIKEA message sent    At Rooming: flow/pvr, questionnaires       Jacqueline Ly CMA  09/01/22  8:39 AM

## 2022-09-25 ENCOUNTER — HEALTH MAINTENANCE LETTER (OUTPATIENT)
Age: 27
End: 2022-09-25

## 2023-06-04 ENCOUNTER — HEALTH MAINTENANCE LETTER (OUTPATIENT)
Age: 28
End: 2023-06-04

## 2024-07-20 ENCOUNTER — HEALTH MAINTENANCE LETTER (OUTPATIENT)
Age: 29
End: 2024-07-20

## (undated) DEVICE — SOL WATER IRRIG 500ML BOTTLE 2F7113

## (undated) DEVICE — SPONGE BALL KERLIX ROUND XL W/O STRING LATEX 4935

## (undated) DEVICE — DRAPE GYN/UROLOGY FLUID POUCH TUR 29455

## (undated) DEVICE — SUCTION MANIFOLD NEPTUNE 2 SYS 4 PORT 0702-020-000

## (undated) DEVICE — SU MONOCRYL 4-0 PS-2 27" UND Y426H

## (undated) DEVICE — LINEN GOWN XLG 5407

## (undated) DEVICE — Device

## (undated) DEVICE — SPONGE RAY-TEC 4X8" 7318

## (undated) DEVICE — PREP CHLORAPREP 26ML TINTED ORANGE  260815

## (undated) DEVICE — SU SILK 2-0 SH CR 5X18" C0125

## (undated) DEVICE — CONNECTOR WATER VALVE PERFUSION PACK STR 020272801

## (undated) DEVICE — DRSG KERLIX FLUFFS X5

## (undated) DEVICE — PACK ENT MINOR CUSTOM ASC

## (undated) DEVICE — LINEN TOWEL PACK X6 WHITE 5487

## (undated) DEVICE — GLOVE PROTEXIS W/NEU-THERA 7.5  2D73TE75

## (undated) DEVICE — SU VICRYL 4-0 RB-1 27" UND J214H

## (undated) DEVICE — SOL NACL 0.9% INJ 1000ML BAG 2B1324X

## (undated) DEVICE — GLOVE PROTEXIS W/NEU-THERA 8.0  2D73TE80

## (undated) DEVICE — GUIDEWIRE SENSOR DUAL FLEX STR 0.035"X150CM M0066703080

## (undated) DEVICE — LINEN TOWEL PACK X5 5464

## (undated) DEVICE — SUTURE BOOTS 051003PBX

## (undated) DEVICE — PREP SKIN SCRUB TRAY 4461A

## (undated) DEVICE — TUBING IRRIG TUR Y TYPE 96" LF 6543-01

## (undated) DEVICE — TUBING IRRIG CYSTO/BLADDER SET 81" LF 2C4040

## (undated) DEVICE — TUBING SUCTION 12"X1/4" N612

## (undated) DEVICE — CATH PLUG W/CAP 000076

## (undated) DEVICE — SYR 50ML LL W/O NDL 309653

## (undated) DEVICE — JELLY LUBRICATING SURGILUBE 2OZ TUBE

## (undated) DEVICE — SU PDS II 5-0 RB-1 DA 30" Z320H

## (undated) DEVICE — ENDO SEAL BX PORT BPS-A

## (undated) DEVICE — DRAPE STERI TOWEL LG 1010

## (undated) DEVICE — ESU GROUND PAD ADULT W/CORD E7507

## (undated) DEVICE — TOOTHBRUSH ADULT NON STERILE MDS136850

## (undated) DEVICE — DRAPE LEGGINGS CLEAR 8430

## (undated) DEVICE — BLADE CLIPPER SGL USE 9680

## (undated) DEVICE — SOL NACL 0.9% IRRIG 500ML BOTTLE 2F7123

## (undated) DEVICE — ESU PENCIL W/COATED BLADE E2450H

## (undated) DEVICE — COVER SLEEVE UNIV LF 89791

## (undated) DEVICE — PREP BALL KERLIX ROUND LATEX

## (undated) DEVICE — SU SILK 3-0 SH 30" K832H

## (undated) DEVICE — PAD CHUX UNDERPAD 30X30"

## (undated) DEVICE — KIT ENDO FIRST STEP DISINFECTANT 200ML W/POUCH EP-4

## (undated) DEVICE — SU CHROMIC 4-0 SH 27" G121H

## (undated) DEVICE — BAG URINARY DRAIN LUBRISIL IC 4000ML LF 253509A

## (undated) DEVICE — CATH FOLYSIL 16FR 15ML AA6116

## (undated) DEVICE — SU VICRYL 3-0 SH 27" UND J416H

## (undated) DEVICE — DRAPE POUCH INSTRUMENT 1018

## (undated) DEVICE — SYR BULB IRRIG 50ML LATEX FREE 0035280

## (undated) DEVICE — PREP POVIDONE IODINE SOLUTION 10% 4OZ BOTTLE 29906-004

## (undated) RX ORDER — LIDOCAINE HYDROCHLORIDE 20 MG/ML
INJECTION, SOLUTION EPIDURAL; INFILTRATION; INTRACAUDAL; PERINEURAL
Status: DISPENSED
Start: 2021-03-26

## (undated) RX ORDER — LIDOCAINE HYDROCHLORIDE 20 MG/ML
SOLUTION OROPHARYNGEAL
Status: DISPENSED
Start: 2021-05-03

## (undated) RX ORDER — PROPOFOL 10 MG/ML
INJECTION, EMULSION INTRAVENOUS
Status: DISPENSED
Start: 2021-03-26

## (undated) RX ORDER — LIDOCAINE HYDROCHLORIDE 20 MG/ML
JELLY TOPICAL
Status: DISPENSED
Start: 2021-03-01

## (undated) RX ORDER — FENTANYL CITRATE 50 UG/ML
INJECTION, SOLUTION INTRAMUSCULAR; INTRAVENOUS
Status: DISPENSED
Start: 2021-03-26

## (undated) RX ORDER — GABAPENTIN 300 MG/1
CAPSULE ORAL
Status: DISPENSED
Start: 2021-03-26

## (undated) RX ORDER — ONDANSETRON 2 MG/ML
INJECTION INTRAMUSCULAR; INTRAVENOUS
Status: DISPENSED
Start: 2021-03-26

## (undated) RX ORDER — HYDROMORPHONE HYDROCHLORIDE 1 MG/ML
INJECTION, SOLUTION INTRAMUSCULAR; INTRAVENOUS; SUBCUTANEOUS
Status: DISPENSED
Start: 2021-03-26

## (undated) RX ORDER — KETOROLAC TROMETHAMINE 30 MG/ML
INJECTION, SOLUTION INTRAMUSCULAR; INTRAVENOUS
Status: DISPENSED
Start: 2021-03-26

## (undated) RX ORDER — CHLORHEXIDINE GLUCONATE ORAL RINSE 1.2 MG/ML
SOLUTION DENTAL
Status: DISPENSED
Start: 2021-03-26

## (undated) RX ORDER — BUPIVACAINE HYDROCHLORIDE AND EPINEPHRINE 5; 5 MG/ML; UG/ML
INJECTION, SOLUTION EPIDURAL; INTRACAUDAL; PERINEURAL
Status: DISPENSED
Start: 2021-03-26

## (undated) RX ORDER — LIDOCAINE HYDROCHLORIDE 20 MG/ML
JELLY TOPICAL
Status: DISPENSED
Start: 2021-05-03

## (undated) RX ORDER — GENTAMICIN 40 MG/ML
INJECTION, SOLUTION INTRAMUSCULAR; INTRAVENOUS
Status: DISPENSED
Start: 2021-03-26

## (undated) RX ORDER — VANCOMYCIN HYDROCHLORIDE 1 G/20ML
INJECTION, POWDER, LYOPHILIZED, FOR SOLUTION INTRAVENOUS
Status: DISPENSED
Start: 2021-03-26

## (undated) RX ORDER — DEXAMETHASONE SODIUM PHOSPHATE 4 MG/ML
INJECTION, SOLUTION INTRA-ARTICULAR; INTRALESIONAL; INTRAMUSCULAR; INTRAVENOUS; SOFT TISSUE
Status: DISPENSED
Start: 2021-03-26

## (undated) RX ORDER — ACETAMINOPHEN 325 MG/1
TABLET ORAL
Status: DISPENSED
Start: 2021-03-26